# Patient Record
Sex: FEMALE | Race: WHITE | URBAN - METROPOLITAN AREA
[De-identification: names, ages, dates, MRNs, and addresses within clinical notes are randomized per-mention and may not be internally consistent; named-entity substitution may affect disease eponyms.]

---

## 2017-07-11 DIAGNOSIS — F41.9 ANXIETY: ICD-10-CM

## 2017-07-11 NOTE — TELEPHONE ENCOUNTER
Pending Prescriptions:                       Disp   Refills    escitalopram (LEXAPRO) 10 MG tablet       30 tab*5            Sig: Take 1 tablet (10 mg) by mouth daily        Last Written Prescription Date: 12/23/2016  Last Fill Quantity: 30, # refills: 5  Last Office Visit with [facility] [place]:  5/25/2016        Last PHQ-9 score on record=   PHQ-9 SCORE 4/22/2015   Total Score 2

## 2017-07-12 RX ORDER — ESCITALOPRAM OXALATE 10 MG/1
10 TABLET ORAL DAILY
Qty: 30 TABLET | Refills: 0 | Status: SHIPPED | OUTPATIENT
Start: 2017-07-12

## 2017-07-12 NOTE — TELEPHONE ENCOUNTER
Medication is being filled for 1 time refill only due to:  Patient needs to be seen because it has been more than one year since last visit.   Whitley SHARIF RN

## 2017-11-20 ENCOUNTER — VIRTUAL VISIT (OUTPATIENT)
Dept: FAMILY MEDICINE | Facility: OTHER | Age: 37
End: 2017-11-20

## 2017-11-20 NOTE — PROGRESS NOTES
"Date:   Clinician: Huang Guerrero  Clinician NPI: [id number]  Patient: Maite Walker  Patient : 1980  Patient Address: [address] [place] [address]  Patient Phone: [phone]  Visit Protocol: Yeast infection  Patient Summary:  Maite is a 37 year old ( : 1980 ) female who initiated a Visit for a presumed vaginal yeast infection. When asked the question \"Please sign me up to receive news, health information and promotions from [employer].\", Maite responded \"No\".    5-10 days ago, she began noticing vaginal pruritus and vaginal discharge.   She denies having open sores, perivulvar rash, perivulvar pruritus, and abdominal pain. She also denies feeling feverish.   She has had one (1) occurrence in the past year and the current symptoms are similar to previous yeast infections. She has not tried to treat her current symptoms with any medication.   She has a more than normal amount of chunky (like cottage cheese), clear or white, thick, non-odorous discharge.   She denies taking antibiotics in the past 2 weeks. She has no medication preference.   She denies pregnancy and denies breastfeeding. She does not menstruate.   She denies risk factors for sexually transmitted infections. She does NOT smoke or use smokeless tobacco.    MEDICATIONS:   Escitalopram (Lexapro)   , ALLERGIES:   sulfa (Bactrim/Septra)    Clinician Response:  Dear Maite,  Based on the information you have provided, you likely have a vaginal yeast infection which is a common infection of the vagina caused by a fungus.  I am prescribing:   Fluconazole (Diflucan) 150 mg oral tablet to treat your yeast infection. Swallow one (1) tablet as a single dose. There are no refills with this prescription.   While you have yeast infection symptoms, do the following:      Avoid irritants such as scented bath products, tampons, pads, or vaginal sprays and powders.    Avoid douching.    Wear cotton underwear and if you " are comfortable doing so, do not wear underwear to bed.    Avoid hot tubs and whirlpool spas.     Most women notice improvement in their symptoms within 1-2 days after starting treatment with complete clearing in 5-7 days. If your symptoms have not improved in 3 days or not resolved in 10 days, please schedule an appointment to see your primary care provider for an evaluation as your symptoms may be caused by an infection other than yeast. Sometimes yeast infections can be associated with other vaginal infections or with sexually transmitted infections (STIs). If you think you may be at risk for a STI please be seen in a clinic.   Diagnosis: Candida Vulvovaginitis  Diagnosis ICD: B37.3  Prescription: fluconazole (Diflucan) 150mg oral tablet 1 tablet, 1 days supply. Take one tablet by mouth one time a day for 1 day. Refills: 0, Refill as needed: no, Allow substitutions: yes  Pharmacy: [id number] - [phone] - [address], [place] [address]

## 2017-12-17 ENCOUNTER — HEALTH MAINTENANCE LETTER (OUTPATIENT)
Age: 37
End: 2017-12-17

## 2018-03-16 ENCOUNTER — VIRTUAL VISIT (OUTPATIENT)
Dept: FAMILY MEDICINE | Facility: OTHER | Age: 38
End: 2018-03-16

## 2018-03-17 NOTE — PROGRESS NOTES
"Date:   Clinician: Lorenzo Mir  Clinician NPI: [id number]  Patient: Maite Walker  Patient : 1980  Patient Address: [address] [place] [address]  Patient Phone: [phone]  Visit Protocol: UTI  Patient Summary:  Maite is a 38 year old ( : 1980 ) female who initiated a Visit for a presumed bladder infection. When asked the question \"Please sign me up to receive news, health information and promotions from [employer].\", Maite responded \"No\".    Her symptoms began today and consist of urgency, urinary frequency, and hesitation.   Symptom Details   Urinary Frequency: Several times each hour    She denies recent antibiotic use, abdominal pain, vomiting, dysuria, hematuria, urinary incontinence, loss of appetite, chills, feeling feverish, nausea, vaginal discharge, flank pain, and foul smelling urine. Maite has never had kidney stones. She has not been hospitalized, been a patient in a nursing home, or had a catheter in the past two weeks. She denies risk factors for sexually transmitted infections.   Maite has had one (1) UTI in the past 12 months. Her most recent bladder infection was not within the last 4 weeks. Her current symptoms are similar to the previous UTI symptoms. She took an antibiotic for her last infection but does not remember which one.   Maite does not get yeast infections when she takes antibiotics.   She denies pregnancy and denies breastfeeding. She does not menstruate.   She does NOT smoke or use smokeless tobacco.   MEDICATIONS:  Escitalopram (Lexapro)   Patient free text response:  None   , ALLERGIES:   sulfa (Bactrim/Septra)    Clinician Response:  Dear Maite,  Based on the information you have provided, you likely have a bladder infection, also called an acute urinary tract infection (UTI).   To treat your infection, I am prescribing:   Nitrofurantoin (Macrobid). Swallow one (1) tablet twice a day for 5 days. Take the tablet with food. " Continue taking the tablets even if you feel better before all the medication is gone. There is no refill with this prescription.   Some people develop allergies to antibiotics. If you notice a new rash, significant swelling, or difficulty breathing, stop the medication immediately and go into a clinic for physical evaluation.   To help treat your current UTI and prevent future occurrences, remember to:     Drink 8-10, 8-ounce glasses of water daily.    Urinate after sexual intercourse.    Wipe front to back after using the bathroom.     Some women may develop a yeast infection as a side effect of taking antibiotics. If you notice symptoms of a yeast infection, [facility] can help treat that condition as well. Simply log in and complete another Visit, which will cover all of the necessary questions to determine the best treatment for you.   You should visit a clinic for a follow-up visit if your symptoms do not improve in 1-2 days or if you experience another urinary tract infection soon after completing this treatment.  If you become pregnant during this course of treatment, stop taking the medication and contact your primary care provider.   Diagnosis: Acute Uncomplicated Bladder Infection  Diagnosis ICD: N39.0  Prescription: nitrofurantoin (Macrobid) 100mg oral tablet 10 tablets, 5 days supply. Take one tablet by mouth two times a day for 5 days. Refills: 0, Refill as needed: no, Allow substitutions: yes  Ph[id number] - [phone] - [address], [place] [address]

## 2018-06-23 ENCOUNTER — VIRTUAL VISIT (OUTPATIENT)
Dept: FAMILY MEDICINE | Facility: OTHER | Age: 38
End: 2018-06-23

## 2018-06-25 NOTE — PROGRESS NOTES
"Date:   Clinician: Casi Starkey  Clinician NPI: [id number]  Patient: Maite Walker  Patient : 1980  Patient Address: [address] [place] [address]  Patient Phone: [phone]  Visit Protocol: Eye conditions  Patient Summary:  Maite is a 38 year old (: 1980 ) female who initiated a Visit for conjunctivitis.  When asked the question \"Please sign me up to receive news, health information and promotions from [employer].\", Maite responded \"No\".    Images of her eye condition were uploaded.   Her symptoms started today and affect the right eye. The symptoms consist of eye redness, itchy eye(s), and drainage coming from the eye(s).   Symptom details     Drainage: The color of the drainage coming out of her eye(s) is yellow. The drainage is thick but does not cause her eyelids to be stuck shut in the morning.    Itchiness: Maite does not have seasonal allergies or hay fever.     Denied symptoms include bumps on the eyelid, eyelid swelling, eye pain, and light sensitivity. Maite has not experienced a decrease in vision and does not have subconjunctival hemorrhage. She does not feel feverish.    Maite has not had a recent diagnosis of conjunctivitis and has not been exposed to someone with a red eye or an eye infection. She also has not had a recent eye surgery, eye injury, foreign body in the eye(s), and cold or ear infection. She does not wear contact lenses. Maite has not ever been diagnosed with glaucoma.   Maite is not taking medication to treat her current symptoms.   Maite does not require proof of evaluation of her eye condition before returning to school, work, or .   Maite does not smoke or use smokeless tobacco.   She denies pregnancy and denies breastfeeding. She does not menstruate.   Additional information as reported by the patient (free text): My eye doesn't look as red as this morning, but it still feels very itchy and irritated. " "I've had conjunctivitis in the past and this feels the same.  MEDICATIONS: [{\"id\"=&gt;[id number], \"description\"=&gt;\"Depo-Provera intramuscular\"}, {\"id\"=&gt;[id number], \"description\"=&gt;\"escitalopram oxalate oral\"}], ALLERGIES: [{\"id\"=&gt;[id number], \"description\"=&gt;\"Sulfa (Sulfonamide Antibiotics)\"}]  Clinician Response:  Dear Maite,  I am sorry you are not feeling well. Your health is our priority. To determine the most appropriate care for you, I would like you to be seen in person to further discuss your health history and symptoms.  You will not be charged for this Visit. Thank you for trusting us with your care.   Diagnosis: Refer for additional evaluation  Diagnosis ICD: R69  Diagnosis ICD: 462.0  "

## 2020-01-30 ENCOUNTER — OFFICE VISIT (OUTPATIENT)
Dept: URGENT CARE | Facility: URGENT CARE | Age: 40
End: 2020-01-30
Payer: COMMERCIAL

## 2020-01-30 VITALS — TEMPERATURE: 98.9 F | OXYGEN SATURATION: 100 % | HEART RATE: 83 BPM

## 2020-01-30 DIAGNOSIS — T26.60XA ACID CHEMICAL BURN OF CONJUNCTIVA, ACCIDENTAL OR UNINTENTIONAL, INITIAL ENCOUNTER: ICD-10-CM

## 2020-01-30 DIAGNOSIS — T54.2X1A ACID CHEMICAL BURN OF CONJUNCTIVA, ACCIDENTAL OR UNINTENTIONAL, INITIAL ENCOUNTER: ICD-10-CM

## 2020-01-30 DIAGNOSIS — H57.13 EYE PAIN, BILATERAL: Primary | ICD-10-CM

## 2020-01-30 DIAGNOSIS — H16.9 KERATITIS, BILATERAL: ICD-10-CM

## 2020-01-30 PROCEDURE — 99215 OFFICE O/P EST HI 40 MIN: CPT | Performed by: PHYSICIAN ASSISTANT

## 2020-01-31 NOTE — PROGRESS NOTES
"SUBJECTIVE:  Chief Complaint:   Chief Complaint   Patient presents with     Urgent Care     Bilateral eye pain      History of Present Illness:  Maite Walker is a 39 year old female who presents complaining of moderate and severe both eyes pain, burning, redness for 2 hour(s).   Onset/timing: sudden.    Associated Signs and Symptoms: eyes pain, light sensitivity  Treatment measures tried include: flushed with water   Contact wearer : Yes     Past Medical History:   Diagnosis Date     Anxiety     \"chronic worrier\"     Migraine with aura      Allergies   Allergen Reactions     Sulfa Drugs Rash     Social History     Socioeconomic History     Marital status: Single     Spouse name: Not on file     Number of children: Not on file     Years of education: Not on file     Highest education level: Not on file   Occupational History     Not on file   Social Needs     Financial resource strain: Not on file     Food insecurity:     Worry: Not on file     Inability: Not on file     Transportation needs:     Medical: Not on file     Non-medical: Not on file   Tobacco Use     Smoking status: Current Some Day Smoker     Smokeless tobacco: Never Used   Substance and Sexual Activity     Alcohol use: Yes     Alcohol/week: 0.0 standard drinks     Drug use: No     Sexual activity: Yes     Partners: Male   Lifestyle     Physical activity:     Days per week: Not on file     Minutes per session: Not on file     Stress: Not on file   Relationships     Social connections:     Talks on phone: Not on file     Gets together: Not on file     Attends Hinduism service: Not on file     Active member of club or organization: Not on file     Attends meetings of clubs or organizations: Not on file     Relationship status: Not on file     Intimate partner violence:     Fear of current or ex partner: Not on file     Emotionally abused: Not on file     Physically abused: Not on file     Forced sexual activity: Not on file   Other Topics Concern "     Not on file   Social History Narrative    [place] 8 yo in [place]    She is nanny/house keeping, likes art     Family History   Problem Relation Age of Onset     Depression Father         untreated and undiagnosed     Anxiety Disorder Mother         ativan as needed       ROS:  CONSTITUTIONAL:NEGATIVE for fever, chills, change in weight  INTEGUMENTARY/SKIN: NEGATIVE for worrisome rashes, moles or lesions  EYES: POSITIVE for eye pain, redness  ENT/MOUTH: NEGATIVE for ear, mouth and throat problems  RESP:NEGATIVE for significant cough or SOB  CV: NEGATIVE for chest pain, palpitations or peripheral edema  GI: NEGATIVE for nausea, abdominal pain, heartburn, or change in bowel habits  MUSCULOSKELETAL: NEGATIVE for significant arthralgias or myalgia  NEURO: POSITIVE for blurry eyes    Flourescein Stain Exam Performed: Positive for flourescein uptake bilateral eyes    OBJECTIVE:  Pulse 83   Temp 98.9  F (37.2  C) (Tympanic)   SpO2 100%   General: no acute distress  Eye exam: right eye abnormal findings: conjunctivitis with erythema, left eye abnormal findings: conjunctivitis with erythema.  Patient has opaque looking eye from what appears to be a chemical burn of cornea  Ears: normal canals, TMs bilaterally, normal TM mobility  Nose: NORMAL - no drainage, turbinates normal in size.  Neck: supple, non-tender, free range of motion, no adenopathy  Lungs: normal and clear to auscultation  Psych: Positive for feeling very anxious  Skin: Negative for skin irritation  Neuro: Positive for PERRLA, EOMI with opaque appearing uptake    ASSESSMENT/PLAN:      ICD-10-CM    1. Eye pain, bilateral H57.13 OPHTHALMOLOGY ADULT REFERRAL   2. Keratitis, bilateral H16.9 OPHTHALMOLOGY ADULT REFERRAL   3. Acid chemical burn of conjunctiva, accidental or unintentional, initial encounter T54.2X1A     T26.60XA        Patient having severe symptoms of eye pain, redness  Patient has chemical burn of eyes  She was scheduled immediately with  [facility] on a urgent basis  She was sent over right away and her mom will accompany her there

## 2020-03-02 ENCOUNTER — HEALTH MAINTENANCE LETTER (OUTPATIENT)
Age: 40
End: 2020-03-02

## 2020-12-20 ENCOUNTER — HEALTH MAINTENANCE LETTER (OUTPATIENT)
Age: 40
End: 2020-12-20

## 2021-04-24 ENCOUNTER — HEALTH MAINTENANCE LETTER (OUTPATIENT)
Age: 41
End: 2021-04-24

## 2021-10-03 ENCOUNTER — HEALTH MAINTENANCE LETTER (OUTPATIENT)
Age: 41
End: 2021-10-03

## 2022-05-15 ENCOUNTER — HEALTH MAINTENANCE LETTER (OUTPATIENT)
Age: 42
End: 2022-05-15

## 2022-09-10 ENCOUNTER — HEALTH MAINTENANCE LETTER (OUTPATIENT)
Age: 42
End: 2022-09-10

## 2023-06-03 ENCOUNTER — HEALTH MAINTENANCE LETTER (OUTPATIENT)
Age: 43
End: 2023-06-03

## 2024-02-18 ENCOUNTER — HEALTH MAINTENANCE LETTER (OUTPATIENT)
Age: 44
End: 2024-02-18